# Patient Record
Sex: MALE | Race: BLACK OR AFRICAN AMERICAN | URBAN - METROPOLITAN AREA
[De-identification: names, ages, dates, MRNs, and addresses within clinical notes are randomized per-mention and may not be internally consistent; named-entity substitution may affect disease eponyms.]

---

## 2021-09-30 ENCOUNTER — TELEPHONE (OUTPATIENT)
Dept: OBGYN CLINIC | Facility: HOSPITAL | Age: 26
End: 2021-09-30

## 2021-09-30 NOTE — TELEPHONE ENCOUNTER
Patient's mother called stating she would like to speak with the doctor if possible  She states several years back Dr Villa Reynolds had a plate placed in the patient's neck and she has a few questions about this      Call back # [phone]  Rufus Roa - Mother

## 2021-09-30 NOTE — TELEPHONE ENCOUNTER
Patients mother advised Dr Vikram Bowles does not come into the office until Monday  Mother is fine with waiting until then to speak to him  Did offer an apt however mother declined until spoke to [address] [place] [address]

## 2021-10-04 NOTE — TELEPHONE ENCOUNTER
Tried to call mother, no voicemail box set up to leave message  pt needs an apt as well as needs to bring records from [facility]/anywhere he has gone for this as nothing in chart